# Patient Record
(demographics unavailable — no encounter records)

---

## 2025-04-11 NOTE — COUNSELING
[Fall prevention counseling provided] : Fall prevention counseling provided [Adequate lighting] : Adequate lighting [No throw rugs] : No throw rugs [Use proper foot wear] : Use proper foot wear [Use recommended devices] : Use recommended devices [Behavioral health counseling provided] : Behavioral health counseling provided [Sleep ___ hours/day] : Sleep [unfilled] hours/day [Engage in a relaxing activity] : Engage in a relaxing activity [Plan in advance] : Plan in advance [No] : Risk of tobacco use and health benefits of smoking cessation discussed: No [Potential consequences of obesity discussed] : Potential consequences of obesity discussed [Benefits of weight loss discussed] : Benefits of weight loss discussed [Encouraged to maintain food diary] : Encouraged to maintain food diary [Encouraged to increase physical activity] : Encouraged to increase physical activity [Encouraged to use exercise tracking device] : Encouraged to use exercise tracking device [Weigh Self Weekly] : weigh self weekly [Decrease Portions] : decrease portions [Keep Food Diary] : keep food diary [Good understanding] : Patient has a good understanding of disease, goals and obesity follow-up plan [FreeTextEntry1] : w/ assistive devices [FreeTextEntry4] : 15

## 2025-04-11 NOTE — HISTORY OF PRESENT ILLNESS
[FreeTextEntry1] : This visit was provided via telehealth using real-time 2-way audio visual technology. The patient, DILLON FROST was located at home, [address] [place] [address], at the time of the visit.   The provider, Regino MANUEL, was located at [facility] at the time of the visit.  The patient, DILLON FROST and provider participated in the telehealth encounter.  Verbal consent for telehealth services was given at time prior to the start of visit.  [de-identified] : HFI. This is DILLON FROST 66 year English, F, presented for virtual visit as stated above.  Recently reported vitals in flow sheet. Medication reconciliation performed.  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  HHA is present. Has active home care services, w/ HHA scheduled: 24hrs a week.  AAO (see PE). Patient denies any feeling of depression, and HI/SI. Patient denies any f/c, sob, cp, dizziness, lightheadedness, abnormal bruising/bleeding, n/v/d, or abdominal pain.

## 2025-04-11 NOTE — PLAN
[FreeTextEntry1] : Patient seen in home, enforced w/ pt the need for daily weight/bp monitoring, low salt diet/fat and educated pt to avoid processed/canned food and limit take out, increase vegetable/fiber and fruit intake (portion control).  Daily exercise w/ easy to reach realistic goals.  Continue w/ current regimen and medication as ordered.  Adhere to follow up w/ pcp.  Pt advised to call with any questions/concerns  Total time spent w/ patient 40mins.  Depression screening time spent >5 mins.

## 2025-04-11 NOTE — REVIEW OF SYSTEMS
[Vision Problems] : vision problems [Incontinence] : incontinence [Unsteady Walking] : ataxia [Negative] : Psychiatric [FreeTextEntry3] : w/ glasses [FreeTextEntry8] : w/ pad

## 2025-04-11 NOTE — PHYSICAL EXAM
[de-identified] : Telehealth precludes traditional, comprehensive physical exam. Patient appeared stable and alert.  virtual video visit w/ objective observation and subject reporting only.

## 2025-04-11 NOTE — HEALTH RISK ASSESSMENT
[Good] : ~his/her~  mood as  good [No] : No [One fall no injury in past year] : Patient reported one fall in the past year without injury [Assistive Device] : Patient uses an assistive device [Little interest or pleasure doing things] : 1) Little interest or pleasure doing things [Feeling down, depressed, or hopeless] : 2) Feeling down, depressed, or hopeless [0] : 2) Feeling down, depressed, or hopeless: Not at all (0) [PHQ-2 Negative - No further assessment needed] : PHQ-2 Negative - No further assessment needed [I have developed a follow-up plan documented below in the note.] : I have developed a follow-up plan documented below in the note. [Never] : Never [Patient reported mammogram was normal] : Patient reported mammogram was normal [Patient reported PAP Smear was normal] : Patient reported PAP Smear was normal [Patient reported colonoscopy was normal] : Patient reported colonoscopy was normal [ SDOH Screening] : Social service referral provided to patient and briefly addressed in the office. [Time Spent: ___ Minutes] : I spent [unfilled] minutes performing an SDOH assessment. [Alone] : lives alone [Feels Safe at Home] : Feels safe at home [Smoke Detector] : smoke detector [Carbon Monoxide Detector] : carbon monoxide detector [With Patient/Caregiver] : , with patient/caregiver [Designated Healthcare Proxy] : Designated healthcare proxy [Name: ___] : Health Care Proxy's Name: [unfilled]  [Relationship: ___] : Relationship: [unfilled] [I will adhere to the patient's wishes.] : I will adhere to the patient's wishes. [Time Spent: ___ minutes] : Time Spent: [unfilled] minutes [Not at All (0)] : 9.) Thoughts that you would be off dead or of hurting yourself in some way? Not at all [PHQ-9 Negative - No further assessment needed] : PHQ-9 Negative - No further assessment needed [Audit-CScore] : 0 [de-identified] : cane [NIL3Gcfgn] : 0 [[id number]] : 0 [Change in mental status noted] : No change in mental status noted [Language] : denies difficulty with language [Behavior] : denies difficulty with behavior [Learning/Retaining New Information] : denies difficulty learning/retaining new information [Handling Complex Tasks] : denies difficulty handling complex tasks [Reasoning] : denies difficulty with reasoning [Spatial Ability and Orientation] : denies difficulty with spatial ability and orientation [Sexually Active] : not sexually active [Reports changes in hearing] : Reports no changes in hearing [Reports changes in vision] : Reports no changes in vision [Reports normal functional visual acuity (ie: able to read med bottle)] : Reports poor functional visual acuity.  [Reports changes in dental health] : Reports no changes in dental health [MammogramDate] : 04/25 [PapSmearDate] : 04/25 [ColonoscopyDate] : 01/19 [de-identified] : w/ HHA [de-identified] : w/ HHA [de-identified] : w/ glasses [AdvancecareDate] : 04/25 [FreeTextEntry4] : hcp completed, content not disclosed